# Patient Record
Sex: MALE | Race: WHITE | Employment: PART TIME | URBAN - METROPOLITAN AREA
[De-identification: names, ages, dates, MRNs, and addresses within clinical notes are randomized per-mention and may not be internally consistent; named-entity substitution may affect disease eponyms.]

---

## 2024-03-15 ENCOUNTER — HOSPITAL ENCOUNTER (EMERGENCY)
Age: 19
Discharge: HOME | End: 2024-03-15
Attending: EMERGENCY MEDICINE
Payer: COMMERCIAL

## 2024-03-15 ENCOUNTER — APPOINTMENT (OUTPATIENT)
Dept: GENERAL RADIOLOGY | Age: 19
End: 2024-03-15
Payer: COMMERCIAL

## 2024-03-15 VITALS
DIASTOLIC BLOOD PRESSURE: 66 MMHG | TEMPERATURE: 97.4 F | WEIGHT: 210 LBS | RESPIRATION RATE: 18 BRPM | HEART RATE: 89 BPM | SYSTOLIC BLOOD PRESSURE: 130 MMHG | OXYGEN SATURATION: 99 %

## 2024-03-15 DIAGNOSIS — S93.401A SPRAIN OF RIGHT ANKLE, UNSPECIFIED LIGAMENT, INITIAL ENCOUNTER: Primary | ICD-10-CM

## 2024-03-15 PROCEDURE — 73610 X-RAY EXAM OF ANKLE: CPT

## 2024-03-15 PROCEDURE — 6370000000 HC RX 637 (ALT 250 FOR IP): Performed by: EMERGENCY MEDICINE

## 2024-03-15 PROCEDURE — 99283 EMERGENCY DEPT VISIT LOW MDM: CPT

## 2024-03-15 RX ORDER — ACETAMINOPHEN 325 MG/1
650 TABLET ORAL ONCE
Status: COMPLETED | OUTPATIENT
Start: 2024-03-15 | End: 2024-03-15

## 2024-03-15 RX ORDER — IBUPROFEN 400 MG/1
800 TABLET ORAL ONCE
Status: COMPLETED | OUTPATIENT
Start: 2024-03-15 | End: 2024-03-15

## 2024-03-15 RX ADMIN — IBUPROFEN 800 MG: 400 TABLET, FILM COATED ORAL at 19:39

## 2024-03-15 RX ADMIN — ACETAMINOPHEN 650 MG: 325 TABLET ORAL at 19:39

## 2024-03-15 NOTE — ED PROVIDER NOTES
Triage Chief Complaint:   Ankle Pain (Right/)    [place]:  Andre Gray is a 19 y.o. male that presents with right ankle injury.  The patient states that he was playing basketball when he landed and rolled his right ankle.  Denies any other injuries.  Denies motor or sensory deficits.  States that he can put very little weight on his right lower extremity.    ROS:  At least 4 systems reviewed and otherwise acutely negative except as in the [place].    History reviewed. No pertinent past medical history.  History reviewed. No pertinent surgical history.  History reviewed. No pertinent family history.  Social History     Socioeconomic History    Marital status: Single     Spouse name: Not on file    Number of children: Not on file    Years of education: Not on file    Highest education level: Not on file   Occupational History    Not on file   Tobacco Use    Smoking status: Never    Smokeless tobacco: Never   Vaping Use    Vaping Use: Every day    Substances: Nicotine   Substance and Sexual Activity    Alcohol use: Never    Drug use: Never    Sexual activity: Not on file   Other Topics Concern    Not on file   Social History Narrative    Not on file     Social Determinants of Health     Financial Resource Strain: Not on file   Food Insecurity: Not on file   Transportation Needs: Not on file   Physical Activity: Not on file   Stress: Not on file   Social Connections: Not on file   Intimate Partner Violence: Not on file   Housing Stability: Not on file     Current Facility-Administered Medications   Medication Dose Route Frequency Provider Last Rate Last Admin    ibuprofen (ADVIL;MOTRIN) tablet 800 mg  800 mg Oral Once Zeb Mercado MD        acetaminophen (TYLENOL) tablet 650 mg  650 mg Oral Once Zeb Mercado MD         No current outpatient medications on file.     Allergies   Allergen Reactions    Penicillins        Nursing Notes Reviewed    Physical Exam:  ED Triage Vitals [03/15/24 1917]   Enc Vitals Group      /66

## 2024-03-15 NOTE — DISCHARGE INSTR - COC
Continuity of Care Form    Patient Name: Andre Gray   :  2005  MRN:  [id number]    Admit date:  3/15/2024  Discharge date:  ***    Code Status Order: No Order   Advance Directives:     Admitting Physician:  No admitting provider for patient encounter.  PCP: No primary care provider on file.    Discharging Nurse: ***  Discharging Hospital Unit/Room#: ED-E01  Discharging Unit Phone Number: ***    Emergency Contact:   Extended Emergency Contact Information  Primary Emergency Contact: amna cochran  Home Phone: [phone]  Relation: Parent    Past Surgical History:  History reviewed. No pertinent surgical history.    Immunization History:   Immunization History   Administered Date(s) Administered    COVID-19, PFIZER GRAY top, DO NOT Dilute, (age 12 y+), IM, 30 mcg/0.3 mL 2022    COVID-19, PFIZER PURPLE top, DILUTE for use, (age 12 y+), 30mcg/0.3mL 2021, 2021       Active Problems:  There is no problem list on file for this patient.      Isolation/Infection:   Isolation            No Isolation          Patient Infection Status       None to display            Nurse Assessment:  Last Vital Signs: /66   Pulse 89   Temp 97.4 °F (36.3 °C)   Resp 18   Wt 95.3 kg (210 lb)   SpO2 99%     Last documented pain score (0-10 scale):    Last Weight:   Wt Readings from Last 1 Encounters:   03/15/24 95.3 kg (210 lb)     Mental Status:  {IP PT MENTAL STATUS:}    IV Access:  { GIL IV ACCESS:783905596}    Nursing Mobility/ADLs:  Walking   {CHP DME ADLs:732511626}  Transfer  {CHP DME ADLs:394736937}  Bathing  {CHP DME ADLs:320959684}  Dressing  {CHP DME ADLs:419671396}  Toileting  {CHP DME ADLs:682074324}  Feeding  {CHP DME ADLs:099494681}  Med Admin  {CHP DME ADLs:553361126}  Med Delivery   { GIL MED Delivery:571168328}    Wound Care Documentation and Therapy:        Elimination:  Continence:   Bowel: {YES / NO:}  Bladder: {YES / NO:}  Urinary Catheter: {Urinary  transfer of Andre Gray  is necessary for the continuing treatment of the diagnosis listed and that he requires {Admit to Appropriate Level of Care:87104} for {GREATER/LESS:247938224} 30 days.     Update Admission H&P: {CHP DME Changes in HandP:108760322}    PHYSICIAN SIGNATURE:  {Esignature:124022468}